# Patient Record
Sex: FEMALE | Race: WHITE | NOT HISPANIC OR LATINO | Employment: FULL TIME | URBAN - METROPOLITAN AREA
[De-identification: names, ages, dates, MRNs, and addresses within clinical notes are randomized per-mention and may not be internally consistent; named-entity substitution may affect disease eponyms.]

---

## 2019-12-18 ENCOUNTER — APPOINTMENT (EMERGENCY)
Dept: CT IMAGING | Facility: HOSPITAL | Age: 84
End: 2019-12-18
Payer: OTHER MISCELLANEOUS

## 2019-12-18 ENCOUNTER — HOSPITAL ENCOUNTER (EMERGENCY)
Facility: HOSPITAL | Age: 84
Discharge: HOME | End: 2019-12-18
Attending: EMERGENCY MEDICINE | Admitting: EMERGENCY MEDICINE
Payer: OTHER MISCELLANEOUS

## 2019-12-18 ENCOUNTER — APPOINTMENT (EMERGENCY)
Dept: RADIOLOGY | Facility: HOSPITAL | Age: 84
End: 2019-12-18
Payer: OTHER MISCELLANEOUS

## 2019-12-18 VITALS
HEART RATE: 76 BPM | RESPIRATION RATE: 16 BRPM | OXYGEN SATURATION: 93 % | TEMPERATURE: 97.3 F | SYSTOLIC BLOOD PRESSURE: 194 MMHG | DIASTOLIC BLOOD PRESSURE: 84 MMHG

## 2019-12-18 DIAGNOSIS — S00.03XA HEMATOMA OF FRONTAL SCALP: ICD-10-CM

## 2019-12-18 DIAGNOSIS — S00.83XA FACIAL CONTUSION, INITIAL ENCOUNTER: ICD-10-CM
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
## 2019-12-18 PROCEDURE — 99284 EMERGENCY DEPT VISIT MOD MDM: CPT | Performed by: EMERGENCY MEDICINE

## 2019-12-18 PROCEDURE — 70450 CT HEAD/BRAIN W/O DYE: CPT

## 2019-12-18 PROCEDURE — 70486 CT MAXILLOFACIAL W/O DYE: CPT

## 2019-12-18 PROCEDURE — 73564 X-RAY EXAM KNEE 4 OR MORE: CPT

## 2019-12-18 PROCEDURE — 99284 EMERGENCY DEPT VISIT MOD MDM: CPT

## 2019-12-18 PROCEDURE — 72125 CT NECK SPINE W/O DYE: CPT

## 2019-12-18 RX ORDER — LOSARTAN POTASSIUM 25 MG/1
25 TABLET ORAL ONCE
Status: DISCONTINUED | OUTPATIENT
Start: 2019-12-18 | End: 2019-12-18 | Stop reason: HOSPADM

## 2019-12-18 RX ORDER — ACETAMINOPHEN 325 MG/1
650 TABLET ORAL ONCE
Status: DISCONTINUED | OUTPATIENT
Start: 2019-12-18 | End: 2019-12-18 | Stop reason: HOSPADM

## 2019-12-18 NOTE — DISCHARGE INSTRUCTIONS
Fall Prevention for Older Adults   WHAT YOU NEED TO KNOW:   As you age, your muscles weaken and your risk for falls increases  Your risk also increases if you take medicines that make you sleepy or dizzy  You may also be at risk if you have vision or joint problems, have low blood pressure, or are not active  DISCHARGE INSTRUCTIONS:   Call 911 or have someone else call if:   · You have fallen and are unconscious  · You have fallen and cannot move part of your body  Contact your healthcare provider if:   · You have fallen and have pain or a headache  · You have questions or concerns about your condition or care  Fall prevention tips:   · Stay active  Exercise can help strengthen your muscles and improve your balance  Your healthcare provider may recommend water aerobics, walking, or [place]  He may also recommend physical therapy to improve your coordination  Never start an exercise program without asking your healthcare provider first     · Wear shoes that fit well and have soles that   Wear shoes both inside and outside  Use slippers with good   Avoid shoes with high heels  · Use assistive devices as directed  Your healthcare provider may suggest that you use a cane or walker to help you keep your balance  You may need to have grab bars put in your bathroom near the toilet or in the shower  · Stand or sit up slowly  This may help you keep your balance and prevent falls  · Wear a personal alarm  This is a device that allows you to call 911 if you need help  Ask for more information on personal alarms  · Manage your medical conditions  Keep all appointments with your healthcare providers  Visit your eye doctor as directed  Home safety tips:   · Add items to prevent falls in the bathroom  Put nonslip strips on your bath or shower floor to prevent you from slipping  Use a bath mat if you do not have carpet in the bathroom   This will prevent you from falling when you step out of the bath or shower  Use a shower seat so you do not need to stand while you shower  Sit on the toilet or a chair in your bathroom to dry yourself and put on clothing  This will prevent you from losing your balance from drying or dressing yourself while you are standing  · Keep paths clear  Remove books, shoes, and other objects from walkways and stairs  Place cords for telephones and lamps out of the way so that you do not need to walk over them  Tape them down if you cannot move them  Remove small rugs  If you cannot remove a rug, secure it with double-sided tape  This will prevent you from tripping  · Install bright lights in your home  Use night lights to help light paths to the bathroom or kitchen  Always turn on the light before you start walking  · Keep items you use often on shelves within reach  Do not use a step stool to help you reach an item  · Paint or place reflective tape on the edges of your stairs  This will help you see the stairs better  Follow up with your healthcare provider as directed:  Write down your questions so you remember to ask them during your visits  © 2017 [address] [employer] Information is for End User's use only and may not be sold, redistributed or otherwise used for commercial purposes  All illustrations and images included in CareNotes® are the copyrighted property of [employer]  or Serafin Castellanos  The above information is an  only  It is not intended as medical advice for individual conditions or treatments  Talk to your doctor, nurse or pharmacist before following any medical regimen to see if it is safe and effective for you  Facial Contusion   WHAT YOU NEED TO KNOW:   A facial contusion is a bruise that appears on your face after an injury  A bruise happens when small blood vessels tear but skin does not  When blood vessels tear, blood leaks into nearby tissue, such as soft tissue or muscle   You may develop swelling and bruising around your eyes if your bruise is on your brow, forehead, or the bridge of your nose  DISCHARGE INSTRUCTIONS:   Return to the emergency department if:   · You have a fever  · You have watery, clear fluid draining from your nose  · You have changes in your vision or eye appearance  · You have changes or pain with eye movement  · You have tingling or numbness in or near the injured area  Contact your healthcare provider if:   · You find a new lump in the injured area  · Your symptoms do not improve with treatment  · You have questions or concerns about your condition or care  Medicines:   · Acetaminophen  decreases pain  It is available without a doctor's order  Ask how much to take and how often to take it  Follow directions  Acetaminophen can cause liver damage if not taken correctly  · Take your medicine as directed  Contact your healthcare provider if you think your medicine is not helping or if you have side effects  Tell him of her if you are allergic to any medicine  Keep a list of the medicines, vitamins, and herbs you take  Include the amounts, and when and why you take them  Bring the list or the pill bottles to follow-up visits  Carry your medicine list with you in case of an emergency  Ice:  Apply ice on your bruise for 15 to 20 minutes every hour or as directed  Use an ice pack, or put crushed ice in a plastic bag  Cover it with a towel  Ice helps prevent tissue damage and decreases swelling and pain  Elevation:  Sleep with your head elevated to help decrease swelling  Help your contusion heal:  Do not  massage the area or put heating pads or other warming devices on the bruise right after your injury  Heat and massage may slow the healing of the area  Follow up with your healthcare provider as directed: You may need to return within a week to have your injury checked again   Write down any questions you have so you remember to ask them in your follow-up visits  Prevent a facial contusion:   · Use safety belts and child restraints  · Use safety helmets when you ride a bicycle or motorcycle  · Use a mouth and face guard during sports  © 2017 [address] [employer] Information is for End User's use only and may not be sold, redistributed or otherwise used for commercial purposes  All illustrations and images included in CareNotes® are the copyrighted property of [employer]  or Serafin Castellanos  The above information is an  only  It is not intended as medical advice for individual conditions or treatments  Talk to your doctor, nurse or pharmacist before following any medical regimen to see if it is safe and effective for you  Scalp Contusion in Adults   WHAT YOU NEED TO KNOW:   A scalp contusion is a bruise on your scalp  There is bleeding under the scalp, but the skin is not broken  You may have swelling at the site of the bruise  DISCHARGE INSTRUCTIONS:   Home care:   · Have someone stay with you for 24 to 48 hours after the injury  Give him the signs of serious injury listed below, such as a seizure or trouble moving  You will need immediate care if you develop signs of a serious injury  · Apply ice to your bruise  Ice helps decrease swelling and pain  Ice may also help prevent tissue damage  Use an ice pack, or put crushed ice in a plastic bag  Cover it with a towel and place it on your bruise for 15 to 20 minutes every hour or as directed  Follow up with your healthcare provider as directed:  Write down your questions so you remember to ask them during your visits  Contact your healthcare provider if:   · You have a headache or neck pain that is getting worse  · You are drowsy and confused  · You have trouble staying balanced or walking  · You are irritable for no reason  · You have problems with your vision  · You cannot stop vomiting    Return to the emergency department or have someone call 911 if:   · You have a seizure  · You cannot be awakened  · You are not able to move part of your body  · Your pupils are different sizes  · You have blood or clear fluid coming out of your nose, ears, or mouth  © 2017 [address] [employer] Information is for End User's use only and may not be sold, redistributed or otherwise used for commercial purposes  All illustrations and images included in CareNotes® are the copyrighted property of A [employer] M [employer]  or Serafin Castellanos  The above information is an  only  It is not intended as medical advice for individual conditions or treatments  Talk to your doctor, nurse or pharmacist before following any medical regimen to see if it is safe and effective for you

## 2019-12-18 NOTE — ED NOTES
Discharged via provider  Pt ambulatory out of department, using steady gait, without assistance, with family       Doris Montanez RN  12/18/19 6577

## 2019-12-18 NOTE — ED PROVIDER NOTES
History  Chief Complaint   Patient presents with    Fall     Pt presents following fall with head injury  Tripped on wires in her office and hit head on the floor, presents with swelling and brusing to R forehead/eye  Pt taking 81mg ASA  Patient is an 72-year-old female with past medical history of diabetes, hypertension, macular degeneration and cataracts, presents to the emergency department after a fall  Patient reports she was at her office and tripped over a wire causing her to fall forward landing on her face  She denies any loss of consciousness  She states she feels pain right above the right eye and it travels up over her parietal scalp and into the back of her neck  She denies any severe headache, dizziness, change in vision, tinnitus or loss of hearing, nasal bone or epistaxis, jaw pain or difficulty moving the jaw, neck stiffness, back pain, chest pain, palpitations, dyspnea, abdominal pain or distention, nausea, vomiting, recent change in bowel habits, urinary symptoms, skin rash or color change, lateralizing extremity weakness or paresthesia or other focal neurologic deficits  Patient is on 81 mg of aspirin daily but denies being on any other blood thinners  While patient was in the waiting room she applied ice to her face and the swelling has already come down  History provided by:  Patient   used: No        None       History reviewed  No pertinent past medical history  History reviewed  No pertinent surgical history  History reviewed  No pertinent family history  I have reviewed and agree with the history as documented  Social History     Tobacco Use    Smoking status: Never Smoker    Smokeless tobacco: Never Used   Substance Use Topics    Alcohol use: Not on file    Drug use: Never        Review of Systems   Constitutional: Negative for chills, fatigue and fever  HENT: Positive for facial swelling   Negative for dental problem, ear pain, hearing loss, nosebleeds, rhinorrhea, tinnitus and trouble swallowing  Eyes: Negative for photophobia, pain and visual disturbance  Respiratory: Negative for cough, chest tightness, shortness of breath and wheezing  Cardiovascular: Negative for chest pain and palpitations  Gastrointestinal: Negative for abdominal pain, constipation, diarrhea, nausea and vomiting  Genitourinary: Negative for dysuria, flank pain, frequency and hematuria  Musculoskeletal: Positive for neck pain  Negative for back pain and neck stiffness  Skin: Negative for color change, pallor, rash and wound  Allergic/Immunologic: Negative for immunocompromised state  Neurological: Negative for dizziness, seizures, syncope, facial asymmetry, speech difficulty, weakness, light-headedness, numbness and headaches  Hematological: Negative for adenopathy  Bruises/bleeds easily  Psychiatric/Behavioral: Negative for confusion and decreased concentration  All other systems reviewed and are negative  Physical Exam  Physical Exam   Constitutional: She is oriented to person, place, and time  She appears well-developed and well-nourished  No distress  TRAUMA PRIMARY SURVEY:   Airway patent and intact  Breathing equal and normal breath sounds bilaterally  Circulation:  2+ radial and DP pulses bilaterally  Disability:  GCS 15  PERRL  Exposure: completed  HENT:   Head: Normocephalic  Right Ear: External ear normal    Left Ear: External ear normal    Mouth/Throat: Oropharynx is clear and moist  No oropharyngeal exudate  Soft tissue swelling and ecchymosis over the right supraorbital region and right frontal scalp  There is ecchymosis and soft tissue swelling over the bridge of the nose  There is tenderness over the superior orbit on the right side  No other facial bone tenderness  No trismus  No epistaxis or septal hematoma  Bilateral ear canals have cerumen impaction and unable to visualize TMs     Eyes: Pupils are equal, round, and reactive to light  Conjunctivae and EOM are normal    Ecchymosis of the right upper eyelid  Neck: Normal range of motion  Neck supple  No JVD present  No tracheal deviation present  Cardiovascular: Normal rate, regular rhythm, normal heart sounds and intact distal pulses  Exam reveals no gallop and no friction rub  No murmur heard  Pulmonary/Chest: Effort normal and breath sounds normal  No respiratory distress  She has no wheezes  She has no rales  She exhibits no tenderness  Abdominal: Soft  Bowel sounds are normal  She exhibits no distension  There is no tenderness  There is no rebound and no guarding  Musculoskeletal: Normal range of motion  She exhibits edema and tenderness  No midline cervical, thoracic or lumbar spine tenderness  No step-offs  Right knee ecchymotic and mildly swollen  Mild diffuse tenderness  FROM right knee  All other extremities atraumatic  Neurological: She is alert and oriented to person, place, and time  No cranial nerve deficit  No gross motor or sensory deficits  5/5 strength throughout  Skin: Skin is warm and dry  No rash noted  She is not diaphoretic  No erythema  No pallor  No lacerations or significant abrasions  Psychiatric: She has a normal mood and affect  Her behavior is normal    Nursing note and vitals reviewed        Vital Signs  ED Triage Vitals   Temperature Pulse Respirations Blood Pressure SpO2   12/18/19 1344 12/18/19 1342 12/18/19 1342 12/18/19 1342 12/18/19 1342   (!) 97 3 °F (36 3 °C) 72 16 160/78 96 %      Temp Source Heart Rate Source Patient Position - Orthostatic VS BP Location FiO2 (%)   12/18/19 1344 12/18/19 1342 12/18/19 1342 12/18/19 1342 --   Oral Monitor Sitting Right arm       Pain Score       12/18/19 1342       2         Vitals:    12/18/19 1344 12/18/19 1432 12/18/19 1530 12/18/19 1630   BP:  (!) 201/86 (!) 189/99 (!) 194/84   BP Location:       Pulse:  78 70 76   Resp:  20 16 16   Temp: (!) 97 3 °F (36 3 °C) TempSrc: Oral      SpO2:   97% 93%       Visual Acuity  Visual Acuity      Most Recent Value   L Pupil Size (mm)  3   R Pupil Size (mm)  3          ED Medications  Medications - No data to display    Diagnostic Studies  Results Reviewed     None                 XR knee 4+ vw right injury   Final Result by Shahnaz Mendoza MD (12/18 1658)      No acute osseous abnormality  Degenerative changes as described  Workstation performed: [id number]         CT follow up   Final Result by Hood Parks MD (12/18 1615)      CT head without contrast   Final Result by Hood Parks MD (12/18 1618)      Frontal scalp soft tissue swelling  No evidence for acute intracranial hemorrhage or depressed calvarial fracture  Workstation performed: [id number]         CT cervical spine without contrast   Final Result by Hood Parks MD (12/18 1624)      No acute fracture or evidence for traumatic malalignment  Workstation performed: [id number]         CT facial bones without contrast   Final Result by Hood Parks MD (12/18 1622)      Right-sided periorbital and frontal scalp soft tissue swelling  No acute fractures identified of the maxillofacial bones  Workstation performed: [id number]                    Procedures  Procedures         ED Course  ED Course as of Dec 19 1459   Wed Dec 18, 2019   1627  Updated patient of normal scans other than soft tissue injury  Recommended Tylenol as needed for pain control as well as continued ice to the areas on and off for the next 24 hours  Recommended PCP follow-up  She did just mention bruising to her right knee and it is ecchymotic but she denies pain and has been walking on it  Will XR and have Dr Dominik Hebert f/u official radiology read prior to discharging                Identification of Seniors at Risk      Most Recent Value   (ISAR) Identification of Seniors at Risk   Before the illness or injury that brought you to the Emergency, did you need someone to help you on a regular basis? 0 Filed at: 12/18/2019 1344   In the last 24 hours, have you needed more help than usual?  0 Filed at: 12/18/2019 1344   Have you been hospitalized for one or more nights during the past 6 months? 0 Filed at: 12/18/2019 1344   In general, do you see well?  0 Filed at: 12/18/2019 1344   In general, do you have serious problems with your memory? 0 Filed at: 12/18/2019 1344   Do you take more than three different medications every day? 1 Filed at: 12/18/2019 1344   ISAR Score  1 Filed at: 12/18/2019 1344                          [facility]  Number of Diagnoses or Management Options  Diagnosis management comments: 25-year-old female presents status post mechanical trip and fall with head and facial injury  Based on complaints and exam findings will obtain CT head without contrast, CT cervical spine and CT facial bones  Will give tylenol and apply ice  Patient admits she did not take her BP medication (losartan) yesterday or today so will give her a dose in ED given /90  Amount and/or Complexity of Data Reviewed  Tests in the radiology section of CPT®: ordered and reviewed  Independent visualization of images, tracings, or specimens: yes          Disposition  Final diagnoses:   Fall on same level from tripping as cause of accidental injury   Facial contusion, initial encounter   Hematoma of frontal scalp     Time reflects when diagnosis was documented in both MDM as applicable and the Disposition within this note     Time User Action Codes Description Comment    12/18/2019  4:25 PM Lyn Curling E Add [W01  0XXA] Fall on same level from tripping as cause of accidental injury     12/18/2019  4:25 PM Lyn Curling E Add [S00 83XA] Facial contusion, initial encounter     12/18/2019  4:25 PM Lyn Curling E Add [S00 03XA] Hematoma of frontal scalp       ED Disposition     ED Disposition Condition Date/Time Comment    Discharge Stable Wed Dec 18, 2019  4:25 PM Coreen Spine Krzysztof Garsia discharge to home/self care  Follow-up Information     Follow up With Specialties Details Why Contact Info Additional Information    Family doctor  Schedule an appointment as soon as possible for a visit        [address] [place] [address] Emergency Department Emergency Medicine Go to  If symptoms worsen [address] [facility] [address] [place], [place], [address] [facility]  Call  To establish care with a primary care doctor [phone]             There are no discharge medications for this patient  No discharge procedures on file      ED Provider  Electronically Signed by           Estefany Dunn DO  12/18/19 1628       Estefany Dunn DO  12/19/19 1500

## 2022-03-17 ENCOUNTER — APPOINTMENT (EMERGENCY)
Dept: CT IMAGING | Facility: HOSPITAL | Age: 87
End: 2022-03-17
Payer: COMMERCIAL

## 2022-03-17 ENCOUNTER — HOSPITAL ENCOUNTER (EMERGENCY)
Facility: HOSPITAL | Age: 87
Discharge: HOME | End: 2022-03-17
Attending: EMERGENCY MEDICINE | Admitting: EMERGENCY MEDICINE
Payer: COMMERCIAL

## 2022-03-17 VITALS
HEART RATE: 95 BPM | SYSTOLIC BLOOD PRESSURE: 209 MMHG | BODY MASS INDEX: 26.58 KG/M2 | HEIGHT: 63 IN | OXYGEN SATURATION: 95 % | DIASTOLIC BLOOD PRESSURE: 98 MMHG | RESPIRATION RATE: 20 BRPM | TEMPERATURE: 98.8 F | WEIGHT: 150 LBS

## 2022-03-17 DIAGNOSIS — S20.212A CHEST WALL CONTUSION, LEFT, INITIAL ENCOUNTER: ICD-10-CM
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
## 2022-03-17 PROCEDURE — 99284 EMERGENCY DEPT VISIT MOD MDM: CPT

## 2022-03-17 PROCEDURE — 71250 CT THORAX DX C-: CPT

## 2022-03-17 PROCEDURE — 99284 EMERGENCY DEPT VISIT MOD MDM: CPT | Performed by: PHYSICIAN ASSISTANT

## 2022-03-17 RX ORDER — ACETAMINOPHEN 325 MG/1
975 TABLET ORAL ONCE
Status: COMPLETED | OUTPATIENT
Start: 2022-03-17 | End: 2022-03-17

## 2022-03-17 RX ORDER — LIDOCAINE 50 MG/G
1 PATCH TOPICAL ONCE
Status: DISCONTINUED | OUTPATIENT
Start: 2022-03-17 | End: 2022-03-17 | Stop reason: HOSPADM

## 2022-03-17 RX ADMIN — ACETAMINOPHEN 975 MG: 325 TABLET, FILM COATED ORAL at 20:56

## 2022-03-17 RX ADMIN — LIDOCAINE 5% 1 PATCH: 700 PATCH TOPICAL at 20:56
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
## 2022-03-18 NOTE — DISCHARGE INSTRUCTIONS
Rest, icing, topical lido patches  Tylenol as needed for pain control  Follow up with Primary Care as needed for reassessment  Please return immediately to the emergency department if you experience any new or worsening symptoms

## 2024-03-29 ENCOUNTER — OFFICE VISIT (OUTPATIENT)
Dept: URGENT CARE | Facility: CLINIC | Age: 89
End: 2024-03-29
Payer: COMMERCIAL

## 2024-03-29 VITALS
WEIGHT: 142 LBS | OXYGEN SATURATION: 96 % | HEART RATE: 110 BPM | DIASTOLIC BLOOD PRESSURE: 88 MMHG | SYSTOLIC BLOOD PRESSURE: 158 MMHG | TEMPERATURE: 98.1 F | BODY MASS INDEX: 25.15 KG/M2

## 2024-03-29 DIAGNOSIS — H61.23 BILATERAL IMPACTED CERUMEN: Primary | ICD-10-CM

## 2024-03-29 PROCEDURE — 99213 OFFICE O/P EST LOW 20 MIN: CPT

## 2024-03-29 NOTE — PATIENT INSTRUCTIONS
Ear drops as prescribed for the next 2-3 days   Return to PCP or [facility] for cerumen removal in 2-3 days

## 2024-03-29 NOTE — PROGRESS NOTES
[facility]        NAME: Velia Chavez is a 88 y.o. female  : 1935    MRN: [id number]  DATE: 2024  TIME: 7:12 PM    Assessment and Plan   Bilateral impacted cerumen [H61.23]  1. Bilateral impacted cerumen  carbamide peroxide (DEBROX) 6.5 % otic solution        Impacted cerumen in bilateral ears. Given drops. Plan for cerumen removal in a few days.     Patient Instructions     Ear drops as prescribed for the next 2-3 days   Return to PCP or [facility] for cerumen removal in 2-3 days    Follow up with PCP in 3-5 days.  Proceed to  ER if symptoms worsen.    If tests are performed, our office will contact you with results only if changes need to made to the care plan discussed with you at the visit. You can review your full results on [facility].    Chief Complaint     Chief Complaint   Patient presents with    Cold Like Symptoms     For about two weeks now. But she was able to get over the cold. Ears have become plugged up.          History of Present Illness       Ear Fullness   There is pain in both ears. This is a new problem. The current episode started in the past 7 days (2 weeks ago when she had a URI it started, worse the last 2 days). The problem has been unchanged. There has been no fever. Associated symptoms include hearing loss (difficulty hearing out of both ears). Pertinent negatives include no abdominal pain, coughing, diarrhea, ear discharge, headaches, neck pain, rhinorrhea, sore throat or vomiting. There is no history of a chronic ear infection or a tympanostomy tube.       Review of Systems   Review of Systems   Constitutional:  Negative for chills and fever.   HENT:  Positive for hearing loss (difficulty hearing out of both ears). Negative for congestion, ear discharge, ear pain, postnasal drip, rhinorrhea, sinus pressure, sore throat and trouble swallowing.    Respiratory:  Negative for cough, chest tightness and shortness of breath.    Cardiovascular:   Negative for chest pain and palpitations.   Gastrointestinal:  Negative for abdominal pain, diarrhea, nausea and vomiting.   Genitourinary:  Negative for difficulty urinating.   Musculoskeletal:  Negative for myalgias and neck pain.   Neurological:  Negative for dizziness and headaches.         Current Medications       Current Outpatient Medications:     carbamide peroxide (DEBROX) 6.5 % otic solution, Administer 5 drops into both ears 2 (two) times a day for 3 days, Disp: 1.5 mL, Rfl: 0    Current Allergies     Allergies as of 03/29/2024    (No Known Allergies)            The following portions of the patient's history were reviewed and updated as appropriate: allergies, current medications, past family history, past medical history, past social history, past surgical history and problem list.     Past Medical History:   Diagnosis Date    Diabetes mellitus (HCC)        History reviewed. No pertinent surgical history.    History reviewed. No pertinent family history.      Medications have been verified.        Objective   /88   Pulse (!) 110   Temp 98.1 °F (36.7 °C)   Wt 64.4 kg (142 lb)   SpO2 96%   BMI 25.15 kg/m²        Physical Exam     Physical Exam  Constitutional:       General: She is not in acute distress.  HENT:      Head: Normocephalic.      Right Ear: There is impacted cerumen.      Left Ear: There is impacted cerumen.      Nose: Nose normal.   Eyes:      Pupils: Pupils are equal, round, and reactive to light.   Cardiovascular:      Rate and Rhythm: Normal rate and regular rhythm.      Pulses: Normal pulses.      Heart sounds: Normal heart sounds.   Pulmonary:      Effort: Pulmonary effort is normal.      Breath sounds: Normal breath sounds.   Abdominal:      General: Abdomen is flat.   Musculoskeletal:         General: Normal range of motion.   Skin:     General: Skin is warm and dry.      Capillary Refill: Capillary refill takes less than 2 seconds.   Neurological:      Mental Status: She  is alert and oriented to person, place, and time.

## 2024-04-01 ENCOUNTER — OFFICE VISIT (OUTPATIENT)
Dept: URGENT CARE | Facility: CLINIC | Age: 89
End: 2024-04-01
Payer: COMMERCIAL

## 2024-04-01 VITALS
DIASTOLIC BLOOD PRESSURE: 80 MMHG | BODY MASS INDEX: 25.15 KG/M2 | SYSTOLIC BLOOD PRESSURE: 143 MMHG | HEART RATE: 90 BPM | OXYGEN SATURATION: 97 % | WEIGHT: 142 LBS

## 2024-04-01 DIAGNOSIS — H66.91 RIGHT OTITIS MEDIA, UNSPECIFIED OTITIS MEDIA TYPE: ICD-10-CM

## 2024-04-01 DIAGNOSIS — H61.23 IMPACTED CERUMEN OF BOTH EARS: Primary | ICD-10-CM

## 2024-04-01 PROCEDURE — 69210 REMOVE IMPACTED EAR WAX UNI: CPT

## 2024-04-01 PROCEDURE — 99213 OFFICE O/P EST LOW 20 MIN: CPT

## 2024-04-01 RX ORDER — AMOXICILLIN 875 MG/1
875 TABLET, COATED ORAL 2 TIMES DAILY
Qty: 14 TABLET | Refills: 0 | Status: SHIPPED | OUTPATIENT
Start: 2024-04-01 | End: 2024-04-08

## 2024-04-01 NOTE — PROGRESS NOTES
[facility]        NAME: Velia Chavez is a 88 y.o. female  : 1935    MRN: [id number]  DATE: 2024  TIME: 9:48 AM    Assessment and Plan   Impacted cerumen of both ears [H61.23]  1. Impacted cerumen of both ears  Ear cerumen removal      2. Right otitis media, unspecified otitis media type  amoxicillin (AMOXIL) 875 mg tablet            Patient Instructions     Antibiotics as prescribed     Follow up with PCP in 3-5 days.  Proceed to  ER if symptoms worsen.    Ear Infection   WHAT YOU NEED TO KNOW:   An ear infection is also called otitis media. Blocked or swollen eustachian tubes can cause an infection. Eustachian tubes connect the middle ear to the back of the nose and throat. They drain fluid from the middle ear. You may have a buildup of fluid in your ear. Germs build up in the fluid and infection develops.        DISCHARGE INSTRUCTIONS:   Return to the emergency department if:   You have clear fluid coming from your ear.     You have a stiff neck, headache, and a fever.     Call your doctor if:   You see blood or pus draining from your ear.     Your ear pain gets worse or does not go away, even after treatment.     The outside of your ear is red or swollen.     You are vomiting or have diarrhea.     You have questions or concerns about your condition or care.     Medicines:  You may  need any of the following:  Acetaminophen  decreases pain and fever. It is available without a doctor's order. Ask how much to take and how often to take it. Follow directions. Read the labels of all other medicines you are using to see if they also contain acetaminophen, or ask your doctor or pharmacist. Acetaminophen can cause liver damage if not taken correctly.     NSAIDs , such as ibuprofen, help decrease swelling, pain, and fever. This medicine is available with or without a doctor's order. NSAIDs can cause stomach bleeding or kidney problems in certain people. If you take blood thinner  medicine, always ask your healthcare provider if NSAIDs are safe for you. Always read the medicine label and follow directions.     Ear drops  may contain medicine to decrease pain and inflammation.     Antibiotics  help treat a bacterial infection.     Take your medicine as directed.  Contact your healthcare provider if you think your medicine is not helping or if you have side effects. Tell your provider if you are allergic to any medicine. Keep a list of the medicines, vitamins, and herbs you take. Include the amounts, and when and why you take them. Bring the list or the pill bottles to follow-up visits. Carry your medicine list with you in case of an emergency.     Self-care:   Apply heat  on your ear for 15 to 20 minutes, 3 to 4 times a day or as directed. You can apply heat with an electric heating pad, hot water bottle, or warm compress. Always put a cloth between your skin and the heat pack to prevent burns. Heat helps decrease pain.     Apply ice  on your ear for 15 to 20 minutes, 3 to 4 times a day for 2 days or as directed. Use an ice pack, or put crushed ice in a plastic bag. Cover it with a towel before you apply it to your ear. Ice decreases swelling and pain.     Prevent an ear infection:   Wash your hands often  to help prevent the spread of germs. Ask everyone in your house to wash their hands with soap and water. Ask them to wash after they use the bathroom or change a diaper. Remind them to wash before they prepare or eat food.          Stay away from people who are ill.  Some germs spread easily and quickly through contact.     Follow up with your doctor as directed:  Write down your questions so you remember to ask them during your visits.  © Copyright [employer] 2023 Information is for End User's use only and may not be sold, redistributed or otherwise used for commercial purposes.  The above information is an  only. It is not intended as medical advice for individual conditions  or treatments. Talk to your doctor, nurse or pharmacist before following any medical regimen to see if it is safe and effective for you.       If tests are performed, our office will contact you with results only if changes need to made to the care plan discussed with you at the visit. You can review your full results on [facility].    Chief Complaint     Chief Complaint   Patient presents with    Earache     Pt is here for bilateral ear clogged, and she is also having pain in the right ear for the last week.          History of Present Illness       Patient seen on Friday for impacted cerumen of both ears. Given Debrox drops which she has been using for the last few days. Continues to have difficulty hearing and pain of the right ear.         Review of Systems   Review of Systems   Constitutional:  Negative for chills and fever.   HENT:  Positive for ear pain (right ear). Negative for congestion, postnasal drip, rhinorrhea, sinus pressure, sore throat and trouble swallowing.    Respiratory:  Negative for cough, chest tightness and shortness of breath.    Cardiovascular:  Negative for chest pain and palpitations.   Gastrointestinal:  Negative for abdominal pain, nausea and vomiting.   Genitourinary:  Negative for difficulty urinating.   Musculoskeletal:  Negative for myalgias.   Neurological:  Negative for dizziness and headaches.         Current Medications       Current Outpatient Medications:     amoxicillin (AMOXIL) 875 mg tablet, Take 1 tablet (875 mg total) by mouth 2 (two) times a day for 7 days, Disp: 14 tablet, Rfl: 0    carbamide peroxide (DEBROX) 6.5 % otic solution, Administer 5 drops into both ears 2 (two) times a day for 3 days, Disp: 1.5 mL, Rfl: 0    Current Allergies     Allergies as of 04/01/2024    (No Known Allergies)            The following portions of the patient's history were reviewed and updated as appropriate: allergies, current medications, past family history, past medical  "history, past social history, past surgical history and problem list.     Past Medical History:   Diagnosis Date    Diabetes mellitus (HCC)        History reviewed. No pertinent surgical history.    No family history on file.      Medications have been verified.        Objective   /80   Pulse 90   Wt 64.4 kg (142 lb)   SpO2 97%   BMI 25.15 kg/m²        Physical Exam     Physical Exam  Constitutional:       General: She is not in acute distress.  HENT:      Head: Normocephalic.      Right Ear: There is impacted cerumen. Tympanic membrane is erythematous and bulging.      Left Ear: There is impacted cerumen. Tympanic membrane is not erythematous or bulging.      Nose: Nose normal.   Eyes:      Pupils: Pupils are equal, round, and reactive to light.   Cardiovascular:      Rate and Rhythm: Normal rate and regular rhythm.      Pulses: Normal pulses.      Heart sounds: Normal heart sounds.   Pulmonary:      Effort: Pulmonary effort is normal.      Breath sounds: Normal breath sounds.   Abdominal:      General: Abdomen is flat.   Musculoskeletal:         General: Normal range of motion.   Skin:     General: Skin is warm and dry.      Capillary Refill: Capillary refill takes less than 2 seconds.   Neurological:      Mental Status: She is alert and oriented to person, place, and time.       Ear cerumen removal    Date/Time: 4/1/2024 9:00 AM    Performed by: Vy Daly PA-C  Authorized by: Vy Daly PA-C  Universal Protocol:  Consent: Verbal consent obtained.  Risks and benefits: risks, benefits and alternatives were discussed  Consent given by: parent  Time out: Immediately prior to procedure a \"time out\" was called to verify the correct patient, procedure, equipment, support staff and site/side marked as required.  Timeout called at: 4/1/2024 9:33 AM.  Patient understanding: patient states understanding of the procedure being performed  Patient consent: the patient's understanding of the procedure " matches consent given  Procedure consent: procedure consent matches procedure scheduled  Relevant documents: relevant documents present and verified  Patient identity confirmed: verbally with patient    Patient location:  Clinic  Indications / Diagnosis:  Impacted cerumen  Procedure details:     Local anesthetic:  None    Location:  L ear and R ear    Procedure type: irrigation with instrumentation      Instrumentation: curette      Approach:  External  Post-procedure details:     Complication:  None    Hearing quality:  Improved    Patient tolerance of procedure:  Tolerated well, no immediate complications

## 2024-04-01 NOTE — PATIENT INSTRUCTIONS
Antibiotics as prescribed     Follow up with PCP in 3-5 days.  Proceed to  ER if symptoms worsen.    If tests are performed, our office will contact you with results only if changes need to made to the care plan discussed with you at the visit. You can review your full results on [facility].